# Patient Record
(demographics unavailable — no encounter records)

---

## 2024-10-08 NOTE — HISTORY OF PRESENT ILLNESS
[N/A] : N/A [Denies] : Denies [No] : No [Yes] : Yes [Declined] : Declined [TB] : Tuberculosis screening [Hep acute panel] : Hepatitis acute panel [Start Time: ___] : Medication Start Time: [unfilled] [End Time: ___] : Medication End Time: [unfilled] [Medication Name: ___] : Medication Name: [unfilled] [Total Amount Administered: ___] : Total Amount Administered: [unfilled] [IV discontinued. Intact. No signs or symptoms of IV complications noted. Time: ___] : IV discontinued. Intact. No signs or symptoms of IV complications noted. Time: [unfilled] [Patient  instructed to seek medical attention with signs and symptoms of adverse effects] : Patient  instructed to seek medical attention with signs and symptoms of adverse effects [Patient left unit in no acute distress] : Patient left unit in no acute distress [Medications administered as ordered and tolerated well.] : Medications administered as ordered and tolerated well. [de-identified] : Patient keep for half hour post infusion for observation

## 2024-10-22 NOTE — HISTORY OF PRESENT ILLNESS
[N/A] : N/A [Denies] : Denies [No] : No [Yes] : Yes [Declined] : Declined [Informed consent documented in EHR.] : Informed consent documented in EHR. [TB] : Tuberculosis screening [Hep acute panel] : Hepatitis acute panel [Total Hep B core AB] : total Hepatitis B Core antibody [Left upper extremity] : Left upper extremity [24g] : 24g [Start Time: ___] : Medication Start Time: [unfilled] [End Time: ___] : Medication End Time: [unfilled] [Medication Name: ___] : Medication Name: [unfilled] [Total Amount Administered: ___] : Total Amount Administered: [unfilled] [IV discontinued. Intact. No signs or symptoms of IV complications noted. Time: ___] : IV discontinued. Intact. No signs or symptoms of IV complications noted. Time: [unfilled] [Patient  instructed to seek medical attention with signs and symptoms of adverse effects] : Patient  instructed to seek medical attention with signs and symptoms of adverse effects [Patient left unit in no acute distress] : Patient left unit in no acute distress [Medications administered as ordered and tolerated well.] : Medications administered as ordered and tolerated well. [de-identified] : 08:33am [de-identified] : NEXT INFUSION - 11/19/24 09:00am

## 2024-12-02 NOTE — HISTORY OF PRESENT ILLNESS
[Denies] : Denies [No] : No [Yes] : Yes [Declined] : Declined [Informed consent documented in EHR.] : Informed consent documented in EHR. [TB] : Tuberculosis screening [Hep acute panel] : Hepatitis acute panel [Left upper extremity] : Left upper extremity [22g] : 22g [Start Time: ___] : Medication Start Time: [unfilled] [End Time: ___] : Medication End Time: [unfilled] [Medication Name: ___] : Medication Name: [unfilled] [Total Amount Administered: ___] : Total Amount Administered: [unfilled] [IV discontinued. Intact. No signs or symptoms of IV complications noted. Time: ___] : IV discontinued. Intact. No signs or symptoms of IV complications noted. Time: [unfilled] [Patient  instructed to seek medical attention with signs and symptoms of adverse effects] : Patient  instructed to seek medical attention with signs and symptoms of adverse effects [Patient left unit in no acute distress] : Patient left unit in no acute distress [Medications administered as ordered and tolerated well.] : Medications administered as ordered and tolerated well. [de-identified] :  Dr. ANUSHA DE JESUS was the supervising provider for this infusion.  [de-identified] : 12:30pm

## 2024-12-02 NOTE — HISTORY OF PRESENT ILLNESS
[Denies] : Denies [No] : No [Yes] : Yes [Declined] : Declined [Informed consent documented in EHR.] : Informed consent documented in EHR. [TB] : Tuberculosis screening [Hep acute panel] : Hepatitis acute panel [Left upper extremity] : Left upper extremity [22g] : 22g [Start Time: ___] : Medication Start Time: [unfilled] [End Time: ___] : Medication End Time: [unfilled] [Medication Name: ___] : Medication Name: [unfilled] [Total Amount Administered: ___] : Total Amount Administered: [unfilled] [IV discontinued. Intact. No signs or symptoms of IV complications noted. Time: ___] : IV discontinued. Intact. No signs or symptoms of IV complications noted. Time: [unfilled] [Patient  instructed to seek medical attention with signs and symptoms of adverse effects] : Patient  instructed to seek medical attention with signs and symptoms of adverse effects [Patient left unit in no acute distress] : Patient left unit in no acute distress [Medications administered as ordered and tolerated well.] : Medications administered as ordered and tolerated well. [de-identified] :  Dr. ANUSHA DE JESUS was the supervising provider for this infusion.  [de-identified] : 12:30pm

## 2025-03-18 NOTE — HISTORY OF PRESENT ILLNESS
[FreeTextEntry8] : Presents with a 6-9-month history of right foot pain that mostly involves the bottom of the foot by his heel. Denies any injury or other precipitating cause. Had done his own research and felt he had planta fasciitis and has been doing some home physical therapy without significant improvement.  It does seem to respond to rest but then is also more painful when he starts walking again after resting for a period of time. Separately also has ongoing right elbow pain that also started a number of months ago without any obvious cause. He does seem to be activity related.

## 2025-03-18 NOTE — PHYSICAL EXAM
[de-identified] : Right foot with some palpable tenderness on the plantar surface close to his heel.  Range of motion is good there is no obvious swelling. Right elbow is without swelling or palpable tenderness.

## 2025-03-18 NOTE — ASSESSMENT
[FreeTextEntry1] : Planta fasciitis/right elbow tendinitis-his symptoms have been persistent despite some home therapy. There was no obvious injury or other precipitating cause. He is going to see orthopedics for further evaluation.

## 2025-04-15 NOTE — HISTORY OF PRESENT ILLNESS
[N/A] : N/A [Denies] : Denies [No] : No [Yes] : Yes [Declined] : Declined [Informed consent documented in EHR.] : Informed consent documented in EHR. [TB] : Tuberculosis screening [Hep acute panel] : Hepatitis acute panel [Total Hep B core AB] : total Hepatitis B Core antibody [Right upper extremity] : Right upper extremity [24g] : 24g [Start Time: ___] : Medication Start Time: [unfilled] [End Time: ___] : Medication End Time: [unfilled] [Medication Name: ___] : Medication Name: [unfilled] [Total Amount Administered: ___] : Total Amount Administered: [unfilled] [IV discontinued. Intact. No signs or symptoms of IV complications noted. Time: ___] : IV discontinued. Intact. No signs or symptoms of IV complications noted. Time: [unfilled] [Patient  instructed to seek medical attention with signs and symptoms of adverse effects] : Patient  instructed to seek medical attention with signs and symptoms of adverse effects [Patient left unit in no acute distress] : Patient left unit in no acute distress [Medications administered as ordered and tolerated well.] : Medications administered as ordered and tolerated well. [de-identified] : 08:25am [de-identified] : NEXT INFUSION- 6/10/25 AT 08:30AM

## 2025-06-18 NOTE — PHYSICAL EXAM
[Alert] : alert [Normal Voice/Communication] : normal voice/communication [Healthy Appearing] : healthy appearing [No Acute Distress] : no acute distress [Sclera] : the sclera and conjunctiva were normal [Hearing Threshold Finger Rub Not [place]] : hearing was normal [Normal Lips/Gums] : the lips and gums were normal [Oropharynx] : the oropharynx was normal [Normal Appearance] : the appearance of the neck was normal [No Neck Mass] : no neck mass was observed [No Respiratory Distress] : no respiratory distress [No Acc Muscle Use] : no accessory muscle use [Respiration, Rhythm And Depth] : normal respiratory rhythm and effort [Auscultation Breath Sounds / Voice Sounds] : lungs were clear to auscultation bilaterally [Heart Rate And Rhythm] : heart rate was normal and rhythm regular [Normal S1, S2] : normal S1 and S2 [Murmurs] : no murmurs [Bowel Sounds] : normal bowel sounds [Abdomen Tenderness] : non-tender [No Masses] : no abdominal mass palpated [Abdomen Soft] : soft [] : no hepatosplenomegaly [Oriented To Time, Place, And Person] : oriented to person, place, and time

## 2025-06-18 NOTE — ASSESSMENT
[FreeTextEntry1] : 35 yo male with history of ulcerative colitis.  Patient has been responding well to the use of Entyvio.  Plan to obtain laboratory test including hepatitis and quant to [facility] testing.  Continue Entyvio.  Patient to discontinue mesalamine at this time.  Follow up in six months or call with any concerns.

## 2025-06-18 NOTE — HISTORY OF PRESENT ILLNESS
[FreeTextEntry1] : Mr. KELSI DOTY is a 34 year old male with history of ulcerative colitis.  Patient has been feeling clinically well and has been receiving every 8 weeks Entyvio infusions.  He has had no complaints of rectal bleeding or abdominal discomfort.  There has been no change in medical history.  Patient has discontinued the use of mesalamine.  Patient had last colonoscopy in February 2023.

## 2025-07-14 NOTE — HISTORY OF PRESENT ILLNESS
[N/A] : N/A [Denies] : Denies [No] : No [Yes] : Yes [Declined] : Declined [TB] : Tuberculosis screening [Hep acute panel] : Hepatitis acute panel [Right upper extremity] : Right upper extremity [24g] : 24g [Start Time: ___] : Medication Start Time: [unfilled] [End Time: ___] : Medication End Time: [unfilled] [Medication Name: ___] : Medication Name: [unfilled] [Total Amount Administered: ___] : Total Amount Administered: [unfilled] [IV discontinued. Intact. No signs or symptoms of IV complications noted. Time: ___] : IV discontinued. Intact. No signs or symptoms of IV complications noted. Time: [unfilled] [Patient  instructed to seek medical attention with signs and symptoms of adverse effects] : Patient  instructed to seek medical attention with signs and symptoms of adverse effects [Patient left unit in no acute distress] : Patient left unit in no acute distress [Medications administered as ordered and tolerated well.] : Medications administered as ordered and tolerated well. [de-identified] : NURIS Simpson was the supervising provider for this infusion.  [de-identified] : 12:20 PM